# Patient Record
Sex: FEMALE | Race: WHITE | NOT HISPANIC OR LATINO | URBAN - NONMETROPOLITAN AREA
[De-identification: names, ages, dates, MRNs, and addresses within clinical notes are randomized per-mention and may not be internally consistent; named-entity substitution may affect disease eponyms.]

---

## 2020-09-17 ENCOUNTER — OFFICE VISIT (OUTPATIENT)
Dept: URBAN - NONMETROPOLITAN AREA CLINIC 2 | Facility: CLINIC | Age: 35
End: 2020-09-17

## 2020-09-24 ENCOUNTER — OFFICE VISIT (OUTPATIENT)
Dept: URBAN - NONMETROPOLITAN AREA CLINIC 2 | Facility: CLINIC | Age: 35
End: 2020-09-24
Payer: MEDICARE

## 2020-09-24 ENCOUNTER — OFFICE VISIT (OUTPATIENT)
Dept: URBAN - NONMETROPOLITAN AREA CLINIC 2 | Facility: CLINIC | Age: 35
End: 2020-09-24

## 2020-09-24 DIAGNOSIS — K59.01 CONSTIPATION: ICD-10-CM

## 2020-09-24 DIAGNOSIS — K60.2 ANAL FISSURE: ICD-10-CM

## 2020-09-24 DIAGNOSIS — E66.9 OBESITY: ICD-10-CM

## 2020-09-24 PROCEDURE — G8417 CALC BMI ABV UP PARAM F/U: HCPCS | Performed by: NURSE PRACTITIONER

## 2020-09-24 PROCEDURE — 99203 OFFICE O/P NEW LOW 30 MIN: CPT | Performed by: NURSE PRACTITIONER

## 2020-09-24 PROCEDURE — 1036F TOBACCO NON-USER: CPT | Performed by: NURSE PRACTITIONER

## 2020-09-24 PROCEDURE — G8427 DOCREV CUR MEDS BY ELIG CLIN: HCPCS | Performed by: NURSE PRACTITIONER

## 2020-09-24 RX ORDER — ACETAMINOPHEN AND CODEINE PHOSPHATE 300; 30 MG/1; MG/1
1 TABLET AS NEEDED TABLET ORAL
Status: ACTIVE | COMMUNITY

## 2020-09-24 RX ORDER — PRAMIPEXOLE DIHYDROCHLORIDE 0.75 MG/1
1 TABLET TABLET ORAL ONCE A DAY
Status: ACTIVE | COMMUNITY

## 2020-09-24 RX ORDER — UBROGEPANT 100 MG/1
1 TABLET MAY TAKE SECOND DOSE AT LEAST 2 HOURS AFTER FIRST DOSE AS NEEDED TABLET ORAL ONCE A DAY
Status: ACTIVE | COMMUNITY

## 2020-09-24 RX ORDER — NALOXEGOL OXALATE 25 MG/1
1 TABLET IN THE MORNING TABLET, FILM COATED ORAL ONCE A DAY
Status: ACTIVE | COMMUNITY

## 2020-09-24 RX ORDER — DOCUSATE SODIUM 100 MG/1
1 TO 4 CAPSULES CAPSULE ORAL
Status: ACTIVE | COMMUNITY

## 2020-09-24 RX ORDER — TIZANIDINE 4 MG/1
1 TABLET AS NEEDED TABLET ORAL THREE TIMES A DAY
Status: ACTIVE | COMMUNITY

## 2020-09-24 RX ORDER — CLONAZEPAM 0.5 MG/1
2 TO 2.5 TABLETS TABLET ORAL ONCE A DAY
Status: ACTIVE | COMMUNITY

## 2020-09-24 RX ORDER — NORGESTIMATE AND ETHINYL ESTRADIOL 0.25-0.035
1 TABLET KIT ORAL ONCE A DAY
Status: ACTIVE | COMMUNITY

## 2020-09-24 RX ORDER — HYDROCODONE BITARTRATE AND ACETAMINOPHEN 10; 325 MG/1; MG/1
1 TABLET AS NEEDED TABLET ORAL
Status: ACTIVE | COMMUNITY

## 2020-09-24 RX ORDER — GABAPENTIN 600 MG/1
1 TABLET TABLET, FILM COATED ORAL ONCE A DAY
Status: ACTIVE | COMMUNITY

## 2020-09-24 RX ORDER — LEVOTHYROXINE SODIUM 0.05 MG/1
1 TABLET IN THE MORNING ON AN EMPTY STOMACH TABLET ORAL ONCE A DAY
Status: ACTIVE | COMMUNITY

## 2020-09-24 NOTE — HPI-TODAY'S VISIT:
Valentina presents for evaluation of rectal bleeding and anal fissure.  She states that she noticed a skin tag 3 weeks ago with pain.  She noticed that her perineum and anus were ashen when after she had a bowel movement.  She is very concerned about the way her anus appeared.  She has a long history of constipation on narcotics as needed.  She used to take Movantik, however now she is on naltrexone regularly.  She is now just taking Colace, 4 daily.  She typically has a bowel movement every 1 to 2 days.  She did go to the emergency room last night as she had fullness and pain with her bowel movement.  Her examination revealed a fissure and skin tag.  She is concerned about her nutrition and what this plays in her bowel movements.  She does struggle with obesity and has a long family history of multiple ailments.  She is also anaphylactically allergic to ammonia and bleach and presents to clinic today with a respirator.  She request specifically that all surfaces not be cleaned prior to her sitting on these due to her severe skin reaction.  Today she is doing well otherwise.  MB

## 2020-10-14 ENCOUNTER — TELEPHONE ENCOUNTER (OUTPATIENT)
Dept: URBAN - NONMETROPOLITAN AREA CLINIC 2 | Facility: CLINIC | Age: 35
End: 2020-10-14

## 2020-10-16 ENCOUNTER — TELEPHONE ENCOUNTER (OUTPATIENT)
Dept: URBAN - NONMETROPOLITAN AREA CLINIC 2 | Facility: CLINIC | Age: 35
End: 2020-10-16

## 2020-10-26 ENCOUNTER — TELEPHONE ENCOUNTER (OUTPATIENT)
Dept: URBAN - METROPOLITAN AREA CLINIC 92 | Facility: CLINIC | Age: 35
End: 2020-10-26

## 2022-01-10 ENCOUNTER — WEB ENCOUNTER (OUTPATIENT)
Dept: URBAN - NONMETROPOLITAN AREA CLINIC 2 | Facility: CLINIC | Age: 37
End: 2022-01-10

## 2022-01-10 ENCOUNTER — OFFICE VISIT (OUTPATIENT)
Dept: URBAN - NONMETROPOLITAN AREA CLINIC 2 | Facility: CLINIC | Age: 37
End: 2022-01-10
Payer: MEDICARE

## 2022-01-10 VITALS
SYSTOLIC BLOOD PRESSURE: 119 MMHG | TEMPERATURE: 98.7 F | DIASTOLIC BLOOD PRESSURE: 82 MMHG | HEART RATE: 88 BPM | HEIGHT: 68 IN | BODY MASS INDEX: 44.41 KG/M2 | WEIGHT: 293 LBS

## 2022-01-10 DIAGNOSIS — K62.5 RECTAL BLEEDING: ICD-10-CM

## 2022-01-10 DIAGNOSIS — K59.09 CHANGE IN BOWEL MOVEMENTS INTERMITTENT CONSTIPATION. URGENCY IN THE MORNING.: ICD-10-CM

## 2022-01-10 DIAGNOSIS — R11.0 NAUSEA: ICD-10-CM

## 2022-01-10 DIAGNOSIS — K60.2 ANAL FISSURE: ICD-10-CM

## 2022-01-10 PROCEDURE — 99214 OFFICE O/P EST MOD 30 MIN: CPT | Performed by: NURSE PRACTITIONER

## 2022-01-10 RX ORDER — GABAPENTIN 600 MG/1
1 TABLET TABLET, FILM COATED ORAL ONCE A DAY
Status: ACTIVE | COMMUNITY

## 2022-01-10 RX ORDER — DOCUSATE SODIUM 100 MG/1
1 TO 4 CAPSULES CAPSULE ORAL
Status: ACTIVE | COMMUNITY

## 2022-01-10 RX ORDER — CLONAZEPAM 0.5 MG/1
2 TO 2.5 TABLETS TABLET ORAL ONCE A DAY
Status: ACTIVE | COMMUNITY

## 2022-01-10 RX ORDER — TIZANIDINE 4 MG/1
1 TABLET AS NEEDED TABLET ORAL THREE TIMES A DAY
Status: ACTIVE | COMMUNITY

## 2022-01-10 RX ORDER — NORGESTIMATE AND ETHINYL ESTRADIOL 0.25-0.035
1 TABLET KIT ORAL ONCE A DAY
Status: ACTIVE | COMMUNITY

## 2022-01-10 RX ORDER — PRAMIPEXOLE DIHYDROCHLORIDE 0.75 MG/1
1 TABLET TABLET ORAL ONCE A DAY
Status: ACTIVE | COMMUNITY

## 2022-01-10 RX ORDER — NALOXEGOL OXALATE 25 MG/1
1 TABLET IN THE MORNING TABLET, FILM COATED ORAL ONCE A DAY
Status: ACTIVE | COMMUNITY

## 2022-01-10 RX ORDER — LEVOTHYROXINE SODIUM 0.05 MG/1
1 TABLET IN THE MORNING ON AN EMPTY STOMACH TABLET ORAL ONCE A DAY
Status: ACTIVE | COMMUNITY

## 2022-01-10 RX ORDER — HYDROCODONE BITARTRATE AND ACETAMINOPHEN 10; 325 MG/1; MG/1
1 TABLET AS NEEDED TABLET ORAL
Status: ACTIVE | COMMUNITY

## 2022-01-10 RX ORDER — ACETAMINOPHEN AND CODEINE PHOSPHATE 300; 30 MG/1; MG/1
1 TABLET AS NEEDED TABLET ORAL
Status: ACTIVE | COMMUNITY

## 2022-01-10 RX ORDER — UBROGEPANT 100 MG/1
1 TABLET MAY TAKE SECOND DOSE AT LEAST 2 HOURS AFTER FIRST DOSE AS NEEDED TABLET ORAL ONCE A DAY
Status: ACTIVE | COMMUNITY

## 2022-01-10 NOTE — HPI-TODAY'S VISIT:
Valentina presents for evaluation of rectal bleeding and anal fissure.  She states that she noticed a skin tag 3 weeks ago with pain.  She noticed that her perineum and anus were ashen when after she had a bowel movement.  She is very concerned about the way her anus appeared.  She has a long history of constipation on narcotics as needed.  She used to take Movantik, however now she is on naltrexone regularly.  She is now just taking Colace, 4 daily.  She typically has a bowel movement every 1 to 2 days.  She did go to the emergency room last night as she had fullness and pain with her bowel movement.  Her examination revealed a fissure and skin tag.  She is concerned about her nutrition and what this plays in her bowel movements.  She does struggle with obesity and has a long family history of multiple ailments.  She is also anaphylactically allergic to ammonia and bleach and presents to clinic today with a respirator.  She request specifically that all surfaces not be cleaned prior to her sitting on these due to her severe skin reaction.  Today she is doing well otherwise.  MB  1/10/2022 Valentina presents for evaluation of rectal bleeding.  On December 18 she had a bowel movement with straining and passed bright red blood per rectum with clots.  She had a bowel movement with clots the week following since then she has had bright red blood per rectum with wiping.  She is on multiple anticholinergic medications.  Unfortunately she is allergic to PEG and propylene glycol.  She is unable to tolerate MiraLAX, she took 1 Colace and had severe rash.  She takes magnesium citrate which helps with her migraines and her bowels usually.  She has anaphylaxis to ammonia and multiple other cleaning agents.  She is able to tolerate Preparation H.  She is also able to tolerate betamethasone but no other compounded steroid creams.  She follows with Dr. Nelson for severe allergies.  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  Today we had a long discussion regarding her work-up.  Unfortunately she is not a candidate for sedation or procedure given her multiple allergies.  On exam she does have a grade 1 external hemorrhoid along with a skin tag with a history of a healed anal fissure.  Her hemoglobin on Christmas Eve was above 12 and it has been stable for over 2 years.  She is currently on her cycle.  She would like labs done to see if she is iron deficient.  She has multiple nutritional triggers for her migraines.  She is never been evaluated by naturopath.  Valentina has a complicated case but is here today for evaluation of her rectal bleeding.  MB

## 2022-01-12 ENCOUNTER — LAB OUTSIDE AN ENCOUNTER (OUTPATIENT)
Dept: URBAN - METROPOLITAN AREA CLINIC 92 | Facility: CLINIC | Age: 37
End: 2022-01-12

## 2022-01-12 ENCOUNTER — TELEPHONE ENCOUNTER (OUTPATIENT)
Dept: URBAN - METROPOLITAN AREA CLINIC 92 | Facility: CLINIC | Age: 37
End: 2022-01-12

## 2022-01-12 LAB
A/G RATIO: 1.8
ALBUMIN: 4.6
ALKALINE PHOSPHATASE: 103
ALT (SGPT): 39
AST (SGOT): 29
BASO (ABSOLUTE): 0
BASOS: 1
BILIRUBIN, TOTAL: 0.3
BUN/CREATININE RATIO: 12
BUN: 8
CALCIUM: 9.9
CARBON DIOXIDE, TOTAL: 22
CHLORIDE: 103
CREATININE: 0.69
DEAMIDATED GLIADIN ABS, IGA: 6
DEAMIDATED GLIADIN ABS, IGG: 2
EGFR IF AFRICN AM: 128
EGFR IF NONAFRICN AM: 111
ENDOMYSIAL ANTIBODY IGA: NEGATIVE
EOS (ABSOLUTE): 0
EOS: 1
FERRITIN, SERUM: 49
GLOBULIN, TOTAL: 2.6
GLUCOSE: 75
HEMATOCRIT: 38.2
HEMATOLOGY COMMENTS:: (no result)
HEMOGLOBIN: 12.9
IMMATURE CELLS: (no result)
IMMATURE GRANS (ABS): 0
IMMATURE GRANULOCYTES: 0
IMMUNOGLOBULIN A, QN, SERUM: 354
IRON BIND.CAP.(TIBC): 401
IRON SATURATION: 8
IRON: 34
LYMPHS (ABSOLUTE): 2.1
LYMPHS: 27
MCH: 29.5
MCHC: 33.8
MCV: 87
MONOCYTES(ABSOLUTE): 0.5
MONOCYTES: 7
NEUTROPHILS (ABSOLUTE): 4.9
NEUTROPHILS: 64
NRBC: (no result)
PLATELETS: 295
POTASSIUM: 4.1
PROTEIN, TOTAL: 7.2
RBC: 4.38
RDW: 13
SODIUM: 141
T-TRANSGLUTAMINASE (TTG) IGA: <2
T-TRANSGLUTAMINASE (TTG) IGG: 5
T4,FREE(DIRECT): 1.28
TSH: 3.04
UIBC: 367
VITAMIN B12: 1825
VITAMIN D, 25-HYDROXY: 34.2
WBC: 7.5

## 2022-01-13 ENCOUNTER — WEB ENCOUNTER (OUTPATIENT)
Dept: URBAN - NONMETROPOLITAN AREA CLINIC 2 | Facility: CLINIC | Age: 37
End: 2022-01-13

## 2022-01-18 ENCOUNTER — OFFICE VISIT (OUTPATIENT)
Dept: URBAN - NONMETROPOLITAN AREA CLINIC 2 | Facility: CLINIC | Age: 37
End: 2022-01-18

## 2022-01-23 LAB
ACTIN (SMOOTH MUSCLE) ANTIBODY: 9
ANA DIRECT: NEGATIVE
GGT: 17
LIVER-KIDNEY MICROSOMAL AB: 0.6
MITOCHONDRIAL (M2) ANTIBODY: <20

## 2022-01-28 ENCOUNTER — LAB OUTSIDE AN ENCOUNTER (OUTPATIENT)
Dept: URBAN - METROPOLITAN AREA CLINIC 92 | Facility: CLINIC | Age: 37
End: 2022-01-28

## 2022-01-28 ENCOUNTER — WEB ENCOUNTER (OUTPATIENT)
Dept: URBAN - NONMETROPOLITAN AREA CLINIC 13 | Facility: CLINIC | Age: 37
End: 2022-01-28

## 2022-01-28 ENCOUNTER — TELEPHONE ENCOUNTER (OUTPATIENT)
Dept: URBAN - METROPOLITAN AREA CLINIC 92 | Facility: CLINIC | Age: 37
End: 2022-01-28

## 2022-01-31 ENCOUNTER — WEB ENCOUNTER (OUTPATIENT)
Dept: URBAN - NONMETROPOLITAN AREA CLINIC 13 | Facility: CLINIC | Age: 37
End: 2022-01-31

## 2022-01-31 ENCOUNTER — LAB OUTSIDE AN ENCOUNTER (OUTPATIENT)
Dept: URBAN - NONMETROPOLITAN AREA CLINIC 13 | Facility: CLINIC | Age: 37
End: 2022-01-31

## 2022-02-03 ENCOUNTER — WEB ENCOUNTER (OUTPATIENT)
Dept: URBAN - NONMETROPOLITAN AREA CLINIC 13 | Facility: CLINIC | Age: 37
End: 2022-02-03

## 2022-02-04 ENCOUNTER — WEB ENCOUNTER (OUTPATIENT)
Dept: URBAN - NONMETROPOLITAN AREA CLINIC 13 | Facility: CLINIC | Age: 37
End: 2022-02-04

## 2022-02-11 ENCOUNTER — WEB ENCOUNTER (OUTPATIENT)
Dept: URBAN - NONMETROPOLITAN AREA CLINIC 13 | Facility: CLINIC | Age: 37
End: 2022-02-11

## 2022-02-28 ENCOUNTER — WEB ENCOUNTER (OUTPATIENT)
Dept: URBAN - NONMETROPOLITAN AREA CLINIC 13 | Facility: CLINIC | Age: 37
End: 2022-02-28

## 2022-05-10 ENCOUNTER — TELEPHONE ENCOUNTER (OUTPATIENT)
Dept: URBAN - NONMETROPOLITAN AREA CLINIC 13 | Facility: CLINIC | Age: 37
End: 2022-05-10

## 2022-05-10 ENCOUNTER — OFFICE VISIT (OUTPATIENT)
Dept: URBAN - NONMETROPOLITAN AREA CLINIC 2 | Facility: CLINIC | Age: 37
End: 2022-05-10
Payer: MEDICARE

## 2022-05-10 VITALS
HEART RATE: 78 BPM | HEIGHT: 68 IN | TEMPERATURE: 97.5 F | SYSTOLIC BLOOD PRESSURE: 119 MMHG | DIASTOLIC BLOOD PRESSURE: 79 MMHG | BODY MASS INDEX: 43.65 KG/M2 | WEIGHT: 288 LBS

## 2022-05-10 DIAGNOSIS — R11.0 NAUSEA: ICD-10-CM

## 2022-05-10 DIAGNOSIS — K60.2 ANAL FISSURE: ICD-10-CM

## 2022-05-10 DIAGNOSIS — K76.9 LIVER LESION: ICD-10-CM

## 2022-05-10 DIAGNOSIS — K21.9 GASTROESOPHAGEAL REFLUX DISEASE, UNSPECIFIED WHETHER ESOPHAGITIS PRESENT: ICD-10-CM

## 2022-05-10 DIAGNOSIS — R74.8 ELEVATED LIVER ENZYMES: ICD-10-CM

## 2022-05-10 DIAGNOSIS — E66.9 OBESITY: ICD-10-CM

## 2022-05-10 DIAGNOSIS — K62.5 RECTAL BLEEDING: ICD-10-CM

## 2022-05-10 DIAGNOSIS — K59.01 CONSTIPATION: ICD-10-CM

## 2022-05-10 PROCEDURE — 99214 OFFICE O/P EST MOD 30 MIN: CPT | Performed by: NURSE PRACTITIONER

## 2022-05-10 RX ORDER — UBROGEPANT 100 MG/1
1 TABLET MAY TAKE SECOND DOSE AT LEAST 2 HOURS AFTER FIRST DOSE AS NEEDED TABLET ORAL ONCE A DAY
Status: ACTIVE | COMMUNITY

## 2022-05-10 RX ORDER — TIZANIDINE 4 MG/1
1 TABLET AS NEEDED TABLET ORAL THREE TIMES A DAY
Status: ACTIVE | COMMUNITY

## 2022-05-10 RX ORDER — GABAPENTIN 600 MG/1
1 TABLET TABLET, FILM COATED ORAL ONCE A DAY
Status: ACTIVE | COMMUNITY

## 2022-05-10 RX ORDER — PRAMIPEXOLE DIHYDROCHLORIDE 0.75 MG/1
1 TABLET TABLET ORAL ONCE A DAY
Status: ACTIVE | COMMUNITY

## 2022-05-10 RX ORDER — NORGESTIMATE AND ETHINYL ESTRADIOL 0.25-0.035
1 TABLET KIT ORAL ONCE A DAY
Status: ACTIVE | COMMUNITY

## 2022-05-10 RX ORDER — ACETAMINOPHEN AND CODEINE PHOSPHATE 300; 30 MG/1; MG/1
1 TABLET AS NEEDED TABLET ORAL
Status: ACTIVE | COMMUNITY

## 2022-05-10 RX ORDER — DOCUSATE SODIUM 100 MG/1
1 TO 4 CAPSULES CAPSULE ORAL
Status: ACTIVE | COMMUNITY

## 2022-05-10 RX ORDER — LEVOTHYROXINE SODIUM 0.05 MG/1
1 TABLET IN THE MORNING ON AN EMPTY STOMACH TABLET ORAL ONCE A DAY
Status: ACTIVE | COMMUNITY

## 2022-05-10 RX ORDER — NALOXEGOL OXALATE 25 MG/1
1 TABLET IN THE MORNING TABLET, FILM COATED ORAL ONCE A DAY
Status: ACTIVE | COMMUNITY

## 2022-05-10 RX ORDER — HYDROCODONE BITARTRATE AND ACETAMINOPHEN 10; 325 MG/1; MG/1
1 TABLET AS NEEDED TABLET ORAL
Status: ACTIVE | COMMUNITY

## 2022-05-10 RX ORDER — CLONAZEPAM 0.5 MG/1
2 TO 2.5 TABLETS TABLET ORAL ONCE A DAY
Status: ACTIVE | COMMUNITY

## 2022-05-10 NOTE — HPI-TODAY'S VISIT:
Valentina presents for evaluation of rectal bleeding and anal fissure.  She states that she noticed a skin tag 3 weeks ago with pain.  She noticed that her perineum and anus were ashen when after she had a bowel movement.  She is very concerned about the way her anus appeared.  She has a long history of constipation on narcotics as needed.  She used to take Movantik, however now she is on naltrexone regularly.  She is now just taking Colace, 4 daily.  She typically has a bowel movement every 1 to 2 days.  She did go to the emergency room last night as she had fullness and pain with her bowel movement.  Her examination revealed a fissure and skin tag.  She is concerned about her nutrition and what this plays in her bowel movements.  She does struggle with obesity and has a long family history of multiple ailments.  She is also anaphylactically allergic to ammonia and bleach and presents to clinic today with a respirator.  She request specifically that all surfaces not be cleaned prior to her sitting on these due to her severe skin reaction.  Today she is doing well otherwise.  MB  1/10/2022 Valentina presents for evaluation of rectal bleeding.  On December 18 she had a bowel movement with straining and passed bright red blood per rectum with clots.  She had a bowel movement with clots the week following since then she has had bright red blood per rectum with wiping.  She is on multiple anticholinergic medications.  Unfortunately she is allergic to PEG and propylene glycol.  She is unable to tolerate MiraLAX, she took 1 Colace and had severe rash.  She takes magnesium citrate which helps with her migraines and her bowels usually.  She has anaphylaxis to ammonia and multiple other cleaning agents.  She is able to tolerate Preparation H.  She is also able to tolerate betamethasone but no other compounded steroid creams.  She follows with Dr. Nelson for severe allergies.  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  Today we had a long discussion regarding her work-up.  Unfortunately she is not a candidate for sedation or procedure given her multiple allergies.  On exam she does have a grade 1 external hemorrhoid along with a skin tag with a history of a healed anal fissure.  Her hemoglobin on Christmas Eve was above 12 and it has been stable for over 2 years.  She is currently on her cycle.  She would like labs done to see if she is iron deficient.  She has multiple nutritional triggers for her migraines.  She is never been evaluated by naturopath.  Valentina has a complicated case but is here today for evaluation of her rectal bleeding.  MB   5/10/2022 Valentina presents for follow up of GERD, Gastropaersis, fatty liver and rectal bleeding. Her labs show no anemia and mildly elevated ALT. Her US shows fatty liver with ? lesion. Her MRI shows focal fatty sparing. Her rectal bleeding has resolved for now. Today she is struggling with her nutrition for the gastroparesis. Today she is doing well otherwise. MB

## 2022-05-11 LAB
ALBUMIN: 4.2
ALKALINE PHOSPHATASE: 115
ALT (SGPT): 15
AST (SGOT): 14
BILIRUBIN, DIRECT: <0.1
BILIRUBIN, TOTAL: 0.3
PROTEIN, TOTAL: 7.4

## 2022-09-02 ENCOUNTER — TELEPHONE ENCOUNTER (OUTPATIENT)
Dept: URBAN - NONMETROPOLITAN AREA CLINIC 13 | Facility: CLINIC | Age: 37
End: 2022-09-02

## 2022-09-02 RX ORDER — CLONAZEPAM 0.5 MG/1
2 TO 2.5 TABLETS TABLET ORAL ONCE A DAY
Status: ACTIVE | COMMUNITY

## 2022-09-02 RX ORDER — ACETAMINOPHEN AND CODEINE PHOSPHATE 300; 30 MG/1; MG/1
1 TABLET AS NEEDED TABLET ORAL
Status: ACTIVE | COMMUNITY

## 2022-09-02 RX ORDER — LEVOTHYROXINE SODIUM 0.05 MG/1
1 TABLET IN THE MORNING ON AN EMPTY STOMACH TABLET ORAL ONCE A DAY
Status: ACTIVE | COMMUNITY

## 2022-09-02 RX ORDER — DOCUSATE SODIUM 100 MG/1
1 TO 4 CAPSULES CAPSULE ORAL
Status: ACTIVE | COMMUNITY

## 2022-09-02 RX ORDER — NALOXEGOL OXALATE 25 MG/1
1 TABLET IN THE MORNING TABLET, FILM COATED ORAL ONCE A DAY
Qty: 30 TABLET | Refills: 11 | OUTPATIENT
Start: 2022-09-08 | End: 2023-09-03

## 2022-09-02 RX ORDER — UBROGEPANT 100 MG/1
1 TABLET MAY TAKE SECOND DOSE AT LEAST 2 HOURS AFTER FIRST DOSE AS NEEDED TABLET ORAL ONCE A DAY
Status: ACTIVE | COMMUNITY

## 2022-09-02 RX ORDER — TIZANIDINE 4 MG/1
1 TABLET AS NEEDED TABLET ORAL THREE TIMES A DAY
Status: ACTIVE | COMMUNITY

## 2022-09-02 RX ORDER — HYDROCODONE BITARTRATE AND ACETAMINOPHEN 10; 325 MG/1; MG/1
1 TABLET AS NEEDED TABLET ORAL
Status: ACTIVE | COMMUNITY

## 2022-09-02 RX ORDER — NALOXEGOL OXALATE 25 MG/1
1 TABLET IN THE MORNING TABLET, FILM COATED ORAL ONCE A DAY
Status: ACTIVE | COMMUNITY

## 2022-09-02 RX ORDER — GABAPENTIN 600 MG/1
1 TABLET TABLET, FILM COATED ORAL ONCE A DAY
Status: ACTIVE | COMMUNITY

## 2022-09-02 RX ORDER — PRAMIPEXOLE DIHYDROCHLORIDE 0.75 MG/1
1 TABLET TABLET ORAL ONCE A DAY
Status: ACTIVE | COMMUNITY

## 2022-09-02 RX ORDER — NORGESTIMATE AND ETHINYL ESTRADIOL 0.25-0.035
1 TABLET KIT ORAL ONCE A DAY
Status: ACTIVE | COMMUNITY

## 2022-11-08 ENCOUNTER — CLAIMS CREATED FROM THE CLAIM WINDOW (OUTPATIENT)
Dept: URBAN - NONMETROPOLITAN AREA CLINIC 2 | Facility: CLINIC | Age: 37
End: 2022-11-08
Payer: MEDICARE

## 2022-11-08 ENCOUNTER — WEB ENCOUNTER (OUTPATIENT)
Dept: URBAN - NONMETROPOLITAN AREA CLINIC 2 | Facility: CLINIC | Age: 37
End: 2022-11-08

## 2022-11-08 VITALS
WEIGHT: 293 LBS | HEIGHT: 68 IN | HEART RATE: 77 BPM | DIASTOLIC BLOOD PRESSURE: 75 MMHG | TEMPERATURE: 97.5 F | BODY MASS INDEX: 44.41 KG/M2 | SYSTOLIC BLOOD PRESSURE: 111 MMHG

## 2022-11-08 DIAGNOSIS — R74.8 ELEVATED LIVER ENZYMES: ICD-10-CM

## 2022-11-08 DIAGNOSIS — K59.01 CONSTIPATION: ICD-10-CM

## 2022-11-08 DIAGNOSIS — K76.9 LIVER LESION: ICD-10-CM

## 2022-11-08 DIAGNOSIS — K60.2 ANAL FISSURE: ICD-10-CM

## 2022-11-08 DIAGNOSIS — E66.9 OBESITY: ICD-10-CM

## 2022-11-08 DIAGNOSIS — R11.0 NAUSEA: ICD-10-CM

## 2022-11-08 DIAGNOSIS — K21.9 GASTROESOPHAGEAL REFLUX DISEASE, UNSPECIFIED WHETHER ESOPHAGITIS PRESENT: ICD-10-CM

## 2022-11-08 DIAGNOSIS — K59.09 CHANGE IN BOWEL MOVEMENTS INTERMITTENT CONSTIPATION. URGENCY IN THE MORNING.: ICD-10-CM

## 2022-11-08 PROBLEM — 414916001 OBESITY: Status: ACTIVE | Noted: 2020-09-24

## 2022-11-08 PROBLEM — 300331000: Status: ACTIVE | Noted: 2022-01-28

## 2022-11-08 PROCEDURE — 99214 OFFICE O/P EST MOD 30 MIN: CPT | Performed by: NURSE PRACTITIONER

## 2022-11-08 RX ORDER — NALOXEGOL OXALATE 25 MG/1
1 TABLET IN THE MORNING TABLET, FILM COATED ORAL ONCE A DAY
Qty: 30 TABLET | Refills: 11 | Status: ACTIVE | COMMUNITY
Start: 2022-09-08 | End: 2023-09-03

## 2022-11-08 RX ORDER — UBROGEPANT 100 MG/1
1 TABLET MAY TAKE SECOND DOSE AT LEAST 2 HOURS AFTER FIRST DOSE AS NEEDED TABLET ORAL ONCE A DAY
Status: ACTIVE | COMMUNITY

## 2022-11-08 RX ORDER — TIZANIDINE 4 MG/1
1 TABLET AS NEEDED TABLET ORAL THREE TIMES A DAY
Status: ACTIVE | COMMUNITY

## 2022-11-08 RX ORDER — NALOXEGOL OXALATE 25 MG/1
1 TABLET IN THE MORNING TABLET, FILM COATED ORAL ONCE A DAY
Status: ACTIVE | COMMUNITY

## 2022-11-08 RX ORDER — PRAMIPEXOLE DIHYDROCHLORIDE 0.75 MG/1
1 TABLET TABLET ORAL ONCE A DAY
Status: ACTIVE | COMMUNITY

## 2022-11-08 RX ORDER — ACETAMINOPHEN AND CODEINE PHOSPHATE 300; 30 MG/1; MG/1
1 TABLET AS NEEDED TABLET ORAL
Status: ACTIVE | COMMUNITY

## 2022-11-08 RX ORDER — PROCHLORPERAZINE MALEATE 10 MG
1 TABLET AS NEEDED TABLET ORAL THREE TIMES A DAY
Qty: 90 TABLET | Refills: 1 | OUTPATIENT
Start: 2022-11-08

## 2022-11-08 RX ORDER — CLONAZEPAM 0.5 MG/1
2 TO 2.5 TABLETS TABLET ORAL ONCE A DAY
Status: ACTIVE | COMMUNITY

## 2022-11-08 RX ORDER — GABAPENTIN 600 MG/1
1 TABLET TABLET, FILM COATED ORAL ONCE A DAY
Status: ACTIVE | COMMUNITY

## 2022-11-08 RX ORDER — DOCUSATE SODIUM 100 MG/1
1 TO 4 CAPSULES CAPSULE ORAL
Status: ACTIVE | COMMUNITY

## 2022-11-08 RX ORDER — HYDROCODONE BITARTRATE AND ACETAMINOPHEN 10; 325 MG/1; MG/1
1 TABLET AS NEEDED TABLET ORAL
Status: ON HOLD | COMMUNITY

## 2022-11-08 RX ORDER — NORGESTIMATE AND ETHINYL ESTRADIOL 0.25-0.035
1 TABLET KIT ORAL ONCE A DAY
Status: ON HOLD | COMMUNITY

## 2022-11-08 RX ORDER — LEVOTHYROXINE SODIUM 0.05 MG/1
1 TABLET IN THE MORNING ON AN EMPTY STOMACH TABLET ORAL ONCE A DAY
Status: ACTIVE | COMMUNITY

## 2022-11-08 NOTE — HPI-TODAY'S VISIT:
Valentina presents for evaluation of rectal bleeding and anal fissure.  She states that she noticed a skin tag 3 weeks ago with pain.  She noticed that her perineum and anus were ashen when after she had a bowel movement.  She is very concerned about the way her anus appeared.  She has a long history of constipation on narcotics as needed.  She used to take Movantik, however now she is on naltrexone regularly.  She is now just taking Colace, 4 daily.  She typically has a bowel movement every 1 to 2 days.  She did go to the emergency room last night as she had fullness and pain with her bowel movement.  Her examination revealed a fissure and skin tag.  She is concerned about her nutrition and what this plays in her bowel movements.  She does struggle with obesity and has a long family history of multiple ailments.  She is also anaphylactically allergic to ammonia and bleach and presents to clinic today with a respirator.  She request specifically that all surfaces not be cleaned prior to her sitting on these due to her severe skin reaction.  Today she is doing well otherwise.  MB  1/10/2022 Valentina presents for evaluation of rectal bleeding.  On December 18 she had a bowel movement with straining and passed bright red blood per rectum with clots.  She had a bowel movement with clots the week following since then she has had bright red blood per rectum with wiping.  She is on multiple anticholinergic medications.  Unfortunately she is allergic to PEG and propylene glycol.  She is unable to tolerate MiraLAX, she took 1 Colace and had severe rash.  She takes magnesium citrate which helps with her migraines and her bowels usually.  She has anaphylaxis to ammonia and multiple other cleaning agents.  She is able to tolerate Preparation H.  She is also able to tolerate betamethasone but no other compounded steroid creams.  She follows with Dr. Nelson for severe allergies.  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  Today we had a long discussion regarding her work-up.  Unfortunately she is not a candidate for sedation or procedure given her multiple allergies.  On exam she does have a grade 1 external hemorrhoid along with a skin tag with a history of a healed anal fissure.  Her hemoglobin on Christmas Eve was above 12 and it has been stable for over 2 years.  She is currently on her cycle.  She would like labs done to see if she is iron deficient.  She has multiple nutritional triggers for her migraines.  She is never been evaluated by naturopath.  Valentina has a complicated case but is here today for evaluation of her rectal bleeding.  MB   5/10/2022 Valentina presents for follow up of GERD, Gastropaersis, fatty liver and rectal bleeding. Her labs show no anemia and mildly elevated ALT. Her US shows fatty liver with ? lesion. Her MRI shows focal fatty sparing. Her rectal bleeding has resolved for now. Today she is struggling with her nutrition for the gastroparesis. Today she is doing well otherwise. MB  11/8/2022 Valentina presents for follow up of GERD, Gastroparesis, fatty liver and constipation. She states the movantik 25mg daily has helped with her post op constipation on pain medications. Her reflux is stable on Dexilant 30mg daily. She states she retried the promethazine with migraines and had another reaction. She wants to avoid the reglan. She feels the compazine helps her nausea and migraines the most. She wants to work with a program on clean eating. She is hiking and working on weight loss. Today she is doing fairly well. MB

## 2023-01-04 ENCOUNTER — ERX REFILL RESPONSE (OUTPATIENT)
Dept: URBAN - NONMETROPOLITAN AREA CLINIC 2 | Facility: CLINIC | Age: 38
End: 2023-01-04

## 2023-01-04 RX ORDER — PROCHLORPERAZINE MALEATE 10 MG
1 TABLET AS NEEDED TABLET ORAL THREE TIMES A DAY
Qty: 90 TABLET | Refills: 1 | OUTPATIENT

## 2023-03-13 ENCOUNTER — ERX REFILL RESPONSE (OUTPATIENT)
Dept: URBAN - NONMETROPOLITAN AREA CLINIC 2 | Facility: CLINIC | Age: 38
End: 2023-03-13

## 2023-03-13 RX ORDER — PROCHLORPERAZINE MALEATE 10 MG
1 TABLET AS NEEDED TABLET ORAL THREE TIMES A DAY
Qty: 90 TABLET | Refills: 1 | OUTPATIENT

## 2023-05-08 ENCOUNTER — OFFICE VISIT (OUTPATIENT)
Dept: URBAN - NONMETROPOLITAN AREA CLINIC 2 | Facility: CLINIC | Age: 38
End: 2023-05-08

## 2023-05-08 ENCOUNTER — WEB ENCOUNTER (OUTPATIENT)
Dept: URBAN - NONMETROPOLITAN AREA CLINIC 2 | Facility: CLINIC | Age: 38
End: 2023-05-08

## 2023-05-08 ENCOUNTER — TELEPHONE ENCOUNTER (OUTPATIENT)
Dept: URBAN - METROPOLITAN AREA CLINIC 23 | Facility: CLINIC | Age: 38
End: 2023-05-08

## 2023-05-09 ENCOUNTER — OFFICE VISIT (OUTPATIENT)
Dept: URBAN - NONMETROPOLITAN AREA CLINIC 2 | Facility: CLINIC | Age: 38
End: 2023-05-09
Payer: MEDICARE

## 2023-05-09 ENCOUNTER — TELEPHONE ENCOUNTER (OUTPATIENT)
Dept: URBAN - NONMETROPOLITAN AREA CLINIC 2 | Facility: CLINIC | Age: 38
End: 2023-05-09

## 2023-05-09 VITALS
HEIGHT: 68 IN | DIASTOLIC BLOOD PRESSURE: 81 MMHG | SYSTOLIC BLOOD PRESSURE: 168 MMHG | BODY MASS INDEX: 44.41 KG/M2 | HEART RATE: 73 BPM | WEIGHT: 293 LBS

## 2023-05-09 DIAGNOSIS — E66.9 OBESITY: ICD-10-CM

## 2023-05-09 DIAGNOSIS — R74.8 ELEVATED LIVER ENZYMES: ICD-10-CM

## 2023-05-09 DIAGNOSIS — K21.9 GASTROESOPHAGEAL REFLUX DISEASE, UNSPECIFIED WHETHER ESOPHAGITIS PRESENT: ICD-10-CM

## 2023-05-09 DIAGNOSIS — K59.01 CONSTIPATION: ICD-10-CM

## 2023-05-09 DIAGNOSIS — K76.9 LIVER LESION: ICD-10-CM

## 2023-05-09 DIAGNOSIS — R11.0 NAUSEA: ICD-10-CM

## 2023-05-09 DIAGNOSIS — K60.2 ANAL FISSURE: ICD-10-CM

## 2023-05-09 PROBLEM — 707724006: Status: ACTIVE | Noted: 2022-01-12

## 2023-05-09 PROBLEM — 14760008 CONSTIPATION: Status: ACTIVE | Noted: 2020-09-24

## 2023-05-09 PROCEDURE — 99214 OFFICE O/P EST MOD 30 MIN: CPT | Performed by: NURSE PRACTITIONER

## 2023-05-09 RX ORDER — DOCUSATE SODIUM 100 MG/1
1 TO 4 CAPSULES CAPSULE ORAL
Status: DISCONTINUED | COMMUNITY

## 2023-05-09 RX ORDER — PRAMIPEXOLE DIHYDROCHLORIDE 1.5 MG/1
1 TABLET TABLET, EXTENDED RELEASE ORAL ONCE A DAY
Status: ACTIVE | COMMUNITY

## 2023-05-09 RX ORDER — PSEUDOEPHEDRINE HCL 30 MG/1
1 TABLET AS NEEDED TABLET, FILM COATED ORAL
Status: ACTIVE | COMMUNITY

## 2023-05-09 RX ORDER — ACETAMINOPHEN 325 MG
1 TABLET AS NEEDED TABLET ORAL
Status: ACTIVE | COMMUNITY

## 2023-05-09 RX ORDER — BETAMETHASONE DIPROPIONATE 0.5 MG/G
1 APPLICATION OINTMENT TOPICAL ONCE A DAY
Status: ACTIVE | COMMUNITY

## 2023-05-09 RX ORDER — BUTALBITAL AND ACETAMINOPHEN 50; 325 MG/1; MG/1
1 TABLET AS NEEDED TABLET ORAL
Status: ACTIVE | COMMUNITY

## 2023-05-09 RX ORDER — ONDANSETRON HYDROCHLORIDE 4 MG/1
1 TABLET TABLET, FILM COATED ORAL ONCE A DAY
Status: ACTIVE | COMMUNITY

## 2023-05-09 RX ORDER — TIZANIDINE 4 MG/1
1 TABLET AS NEEDED TABLET ORAL THREE TIMES A DAY
Status: ACTIVE | COMMUNITY

## 2023-05-09 RX ORDER — BISMUTH SUBSALICYLATE 262MG/15ML
30 ML WITH MEALS AND AT BEDTIME SUSPENSION, ORAL (FINAL DOSE FORM) ORAL
Status: ACTIVE | COMMUNITY

## 2023-05-09 RX ORDER — DEXLANSOPRAZOLE 30 MG/1
1 CAPSULE CAPSULE, DELAYED RELEASE ORAL ONCE A DAY
Qty: 90 CAPSULE | Refills: 3 | OUTPATIENT
Start: 2023-05-09

## 2023-05-09 RX ORDER — ASCORBIC ACID 125 MG
AS DIRECTED TABLET,CHEWABLE ORAL
Status: ACTIVE | COMMUNITY

## 2023-05-09 RX ORDER — NARATRIPTAN 1 MG/1
1 TABLET TABLET ORAL ONCE A DAY
Status: ACTIVE | COMMUNITY

## 2023-05-09 RX ORDER — MENTHOL 40 MG/ML
1 APPLICATION AS NEEDED GEL TOPICAL THREE TIMES A DAY
Status: ACTIVE | COMMUNITY

## 2023-05-09 RX ORDER — PRAMIPEXOLE DIHYDROCHLORIDE 0.75 MG/1
1 TABLET TABLET ORAL ONCE A DAY
Status: DISCONTINUED | COMMUNITY

## 2023-05-09 RX ORDER — ZINC GLUCONATE, SAMBUCUS NIGRA, AVENA SATIVA L., ROSA CANINA, ECHINACEA PURPUREA 6.2; 2; 4; 50; 13.3 MG/1; MG/1; MG/1; MG/1; MG/1
AS DIRECTED LOZENGE ORAL
Status: ACTIVE | COMMUNITY

## 2023-05-09 RX ORDER — NALOXEGOL OXALATE 25 MG/1
1 TABLET IN THE MORNING TABLET, FILM COATED ORAL ONCE A DAY
Status: DISCONTINUED | COMMUNITY

## 2023-05-09 RX ORDER — DIPHENHYDRAMINE HCL 25 MG/1
1/2 CAPSULE TABLET, COATED ORAL ONCE A DAY
Status: ACTIVE | COMMUNITY

## 2023-05-09 RX ORDER — MUPIROCIN 20 MG/G
1 APPLICATION OINTMENT TOPICAL TWICE A DAY
Status: ACTIVE | COMMUNITY

## 2023-05-09 RX ORDER — MECLIZINE HCL 12.5 MG 12.5 MG/1
1 TABLET AS NEEDED TABLET ORAL
Status: ACTIVE | COMMUNITY

## 2023-05-09 RX ORDER — LIDOCAINE AND PRILOCAINE 25; 25 MG/G; MG/G
AS DIRECTED CREAM TOPICAL
Status: ACTIVE | COMMUNITY

## 2023-05-09 RX ORDER — CALCIUM CARBONATE 500 MG/1
1 TABLET TABLET ORAL ONCE A DAY
Status: ACTIVE | COMMUNITY

## 2023-05-09 RX ORDER — CHOLECALCIFEROL (VITAMIN D3) 50 MCG
1 CAPSULE CAPSULE ORAL ONCE A DAY
Status: ACTIVE | COMMUNITY

## 2023-05-09 RX ORDER — UBROGEPANT 100 MG/1
1 TABLET MAY TAKE SECOND DOSE AT LEAST 2 HOURS AFTER FIRST DOSE AS NEEDED TABLET ORAL ONCE A DAY
Status: ACTIVE | COMMUNITY

## 2023-05-09 RX ORDER — ASCORBIC ACID 250 MG
1 TABLET TABLET,CHEWABLE ORAL ONCE A DAY
Status: ACTIVE | COMMUNITY

## 2023-05-09 RX ORDER — GLYCERIN/PROPYLENE GLYCOL 0.6 %-0.6%
AS DIRECTED DROPPERETTE, SINGLE-USE DROP DISPENSER OPHTHALMIC (EYE)
Status: ACTIVE | COMMUNITY

## 2023-05-09 RX ORDER — CLONAZEPAM 0.5 MG/1
2 TO 2.5 TABLETS TABLET ORAL ONCE A DAY
Status: ACTIVE | COMMUNITY

## 2023-05-09 RX ORDER — ACETAMINOPHEN AND CODEINE PHOSPHATE 300; 30 MG/1; MG/1
1 TABLET AS NEEDED TABLET ORAL
Status: ACTIVE | COMMUNITY

## 2023-05-09 RX ORDER — GABAPENTIN 100 MG/1
1 CAPSULE CAPSULE ORAL ONCE A DAY
Status: ACTIVE | COMMUNITY

## 2023-05-09 RX ORDER — GABAPENTIN 600 MG/1
1 TABLET TABLET, FILM COATED ORAL ONCE A DAY
Status: ACTIVE | COMMUNITY

## 2023-05-09 RX ORDER — MINERAL OIL, PETROLATUM, PHENYLEPHRINE HCL 2.5; 140; 749 MG/G; MG/G; MG/G
AS DIRECTED OINTMENT TOPICAL
Status: ACTIVE | COMMUNITY

## 2023-05-09 RX ORDER — RESVERA/CHROM/GR.TEA/EGCG/DIG3 50MG-20MCG
AS DIRECTED CAPSULE ORAL
Status: ACTIVE | COMMUNITY

## 2023-05-09 RX ORDER — LEVOTHYROXINE SODIUM 75 UG/1
1 TABLET IN THE MORNING ON AN EMPTY STOMACH TABLET ORAL ONCE A DAY
Status: ACTIVE | COMMUNITY

## 2023-05-09 RX ORDER — NORETHINDRONE 0.35 MG/1
1 TABLET TABLET ORAL ONCE A DAY
Status: ACTIVE | COMMUNITY

## 2023-05-09 RX ORDER — RIMEGEPANT SULFATE 75 MG/75MG
1 TABLET ON THE TONGUE AND ALLOW TO DISSOLVE TABLET, ORALLY DISINTEGRATING ORAL
Status: ACTIVE | COMMUNITY

## 2023-05-09 RX ORDER — IRON 18 MG
AS DIRECTED TABLET ORAL
Status: ACTIVE | COMMUNITY

## 2023-05-09 RX ORDER — ACETAMINOPHEN 650 MG/1
2 TABLETS AS NEEDED TABLET ORAL
Status: ACTIVE | COMMUNITY

## 2023-05-09 RX ORDER — CALENDULA OFFICINALIS FLOWERING TOP 1 [HP_X]/G
AS DIRECTED CREAM TOPICAL
Status: ACTIVE | COMMUNITY

## 2023-05-09 RX ORDER — NALOXEGOL OXALATE 25 MG/1
1 TABLET IN THE MORNING TABLET, FILM COATED ORAL ONCE A DAY
Qty: 30 TABLET | Refills: 11 | Status: ACTIVE | COMMUNITY
Start: 2022-09-08 | End: 2023-09-03

## 2023-05-09 RX ORDER — PROCHLORPERAZINE MALEATE 10 MG
1 TABLET AS NEEDED TABLET ORAL THREE TIMES A DAY
Qty: 90 TABLET | Refills: 1 | Status: ACTIVE | COMMUNITY

## 2023-05-09 RX ORDER — GINGER ROOT 550 MG
AS DIRECTED CAPSULE ORAL
Status: ACTIVE | COMMUNITY

## 2023-05-09 RX ORDER — NORGESTIMATE AND ETHINYL ESTRADIOL 0.25-0.035
1 TABLET KIT ORAL ONCE A DAY
Status: DISCONTINUED | COMMUNITY

## 2023-05-09 RX ORDER — NALOXEGOL OXALATE 25 MG/1
1 TABLET IN THE MORNING TABLET, FILM COATED ORAL ONCE A DAY
Qty: 90 TABLET | Refills: 3 | OUTPATIENT
Start: 2023-05-09 | End: 2024-05-03

## 2023-05-09 RX ORDER — METHOCARBAMOL 750 MG/1
1/4 TO 1/2 TABLET TABLET ORAL
Status: ACTIVE | COMMUNITY

## 2023-05-09 RX ORDER — HYDROCODONE BITARTRATE AND ACETAMINOPHEN 10; 325 MG/1; MG/1
1 TABLET AS NEEDED TABLET ORAL
Status: DISCONTINUED | COMMUNITY

## 2023-05-09 RX ORDER — MAGNESIUM GLYCINATE 100 MG
AS DIRECTED CAPSULE ORAL
Status: ACTIVE | COMMUNITY

## 2023-05-09 RX ORDER — SUMATRIPTAN SUCCINATE 50 MG/1
1 TABLET AT LEAST 2 HOURS BETWEEN DOSES AS NEEDED TABLET, FILM COATED ORAL TWICE A DAY
Status: ACTIVE | COMMUNITY

## 2023-05-09 RX ORDER — LIDOCAINE HCL 4 %
1 APPLICATION CREAM (GRAM) TOPICAL THREE TIMES A DAY
Status: ACTIVE | COMMUNITY

## 2023-05-09 NOTE — HPI-TODAY'S VISIT:
Valentina presents for evaluation of rectal bleeding and anal fissure.  She states that she noticed a skin tag 3 weeks ago with pain.  She noticed that her perineum and anus were ashen when after she had a bowel movement.  She is very concerned about the way her anus appeared.  She has a long history of constipation on narcotics as needed.  She used to take Movantik, however now she is on naltrexone regularly.  She is now just taking Colace, 4 daily.  She typically has a bowel movement every 1 to 2 days.  She did go to the emergency room last night as she had fullness and pain with her bowel movement.  Her examination revealed a fissure and skin tag.  She is concerned about her nutrition and what this plays in her bowel movements.  She does struggle with obesity and has a long family history of multiple ailments.  She is also anaphylactically allergic to ammonia and bleach and presents to clinic today with a respirator.  She request specifically that all surfaces not be cleaned prior to her sitting on these due to her severe skin reaction.  Today she is doing well otherwise.  MB  1/10/2022 Valentina presents for evaluation of rectal bleeding.  On December 18 she had a bowel movement with straining and passed bright red blood per rectum with clots.  She had a bowel movement with clots the week following since then she has had bright red blood per rectum with wiping.  She is on multiple anticholinergic medications.  Unfortunately she is allergic to PEG and propylene glycol.  She is unable to tolerate MiraLAX, she took 1 Colace and had severe rash.  She takes magnesium citrate which helps with her migraines and her bowels usually.  She has anaphylaxis to ammonia and multiple other cleaning agents.  She is able to tolerate Preparation H.  She is also able to tolerate betamethasone but no other compounded steroid creams.  She follows with Dr. Nelson for severe allergies.  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  Today we had a long discussion regarding her work-up.  Unfortunately she is not a candidate for sedation or procedure given her multiple allergies.  On exam she does have a grade 1 external hemorrhoid along with a skin tag with a history of a healed anal fissure.  Her hemoglobin on Christmas Eve was above 12 and it has been stable for over 2 years.  She is currently on her cycle.  She would like labs done to see if she is iron deficient.  She has multiple nutritional triggers for her migraines.  She is never been evaluated by naturopath.  Valentina has a complicated case but is here today for evaluation of her rectal bleeding.  MB   5/10/2022 Valentina presents for follow up of GERD, Gastropaersis, fatty liver and rectal bleeding. Her labs show no anemia and mildly elevated ALT. Her US shows fatty liver with ? lesion. Her MRI shows focal fatty sparing. Her rectal bleeding has resolved for now. Today she is struggling with her nutrition for the gastroparesis. Today she is doing well otherwise. MB  11/8/2022 Valentina presents for follow up of GERD, Gastroparesis, fatty liver and constipation. She states the movantik 25mg daily has helped with her post op constipation on pain medications. Her reflux is stable on Dexilant 30mg daily. She states she retried the promethazine with migraines and had another reaction. She wants to avoid the reglan. She feels the compazine helps her nausea and migraines the most. She wants to work with a program on clean eating. She is hiking and working on weight loss. Today she is doing fairly well. MB 5/9/2023 Valentina presents for follow-up of reflux, nausea, and OIC.  She is doing well on Dexilant 30 mg daily.  Her migraines have been better managed and she has had no significant nausea, she does take Compazine when she has a flare this also helps with her migraines.  She is just been taking Movantik 25 mg just as needed.  I want her to try this on a regular basis.  Today her GI complaints are fairly stable.  She will continue to follow with her primary care physician for her lab work.  She continues to work on weight loss.  She unfortunately remains displaced from a septic tank leak  so she has been living in an apartment while they repair her in her mother's home.  She has been walking more often and feels that this is helping her significantly.  MB

## 2023-11-07 ENCOUNTER — DASHBOARD ENCOUNTERS (OUTPATIENT)
Age: 38
End: 2023-11-07

## 2023-11-07 ENCOUNTER — OFFICE VISIT (OUTPATIENT)
Dept: URBAN - NONMETROPOLITAN AREA CLINIC 2 | Facility: CLINIC | Age: 38
End: 2023-11-07
Payer: MEDICARE

## 2023-11-07 VITALS
BODY MASS INDEX: 44.41 KG/M2 | HEIGHT: 68 IN | DIASTOLIC BLOOD PRESSURE: 78 MMHG | TEMPERATURE: 98.7 F | WEIGHT: 293 LBS | SYSTOLIC BLOOD PRESSURE: 125 MMHG | HEART RATE: 80 BPM

## 2023-11-07 DIAGNOSIS — K60.2 ANAL FISSURE: ICD-10-CM

## 2023-11-07 DIAGNOSIS — K59.01 CONSTIPATION: ICD-10-CM

## 2023-11-07 DIAGNOSIS — K21.9 GASTROESOPHAGEAL REFLUX DISEASE, UNSPECIFIED WHETHER ESOPHAGITIS PRESENT: ICD-10-CM

## 2023-11-07 DIAGNOSIS — E66.9 OBESITY: ICD-10-CM

## 2023-11-07 PROBLEM — 30037006 ANAL FISSURE: Status: ACTIVE | Noted: 2020-09-24

## 2023-11-07 PROBLEM — 23913003: Status: ACTIVE | Noted: 2023-11-07

## 2023-11-07 PROBLEM — 422587007: Status: ACTIVE | Noted: 2022-01-10

## 2023-11-07 PROBLEM — 235595009: Status: ACTIVE | Noted: 2022-05-10

## 2023-11-07 PROCEDURE — 99214 OFFICE O/P EST MOD 30 MIN: CPT | Performed by: NURSE PRACTITIONER

## 2023-11-07 RX ORDER — NALOXEGOL OXALATE 25 MG/1
1 TABLET IN THE MORNING TABLET, FILM COATED ORAL ONCE A DAY
Qty: 90 TABLET | Refills: 3 | Status: ACTIVE | COMMUNITY
Start: 2023-05-09 | End: 2024-05-03

## 2023-11-07 RX ORDER — MECLIZINE HYDROCHLORIDE 25 MG/1
1 TABLET AS NEEDED TABLET ORAL
Status: ACTIVE | COMMUNITY

## 2023-11-07 RX ORDER — BETAMETHASONE DIPROPIONATE 0.5 MG/G
1 APPLICATION OINTMENT TOPICAL ONCE A DAY
Status: ACTIVE | COMMUNITY

## 2023-11-07 RX ORDER — DIPHENHYDRAMINE HCL 25 MG/1
1/2 CAPSULE TABLET, COATED ORAL ONCE A DAY
Status: ACTIVE | COMMUNITY

## 2023-11-07 RX ORDER — PRAMIPEXOLE DIHYDROCHLORIDE 1.5 MG/1
1 TABLET TABLET, EXTENDED RELEASE ORAL ONCE A DAY
Status: ACTIVE | COMMUNITY

## 2023-11-07 RX ORDER — GLYCERIN/PROPYLENE GLYCOL 0.6 %-0.6%
AS DIRECTED DROPPERETTE, SINGLE-USE DROP DISPENSER OPHTHALMIC (EYE)
Status: ACTIVE | COMMUNITY

## 2023-11-07 RX ORDER — CALENDULA OFFICINALIS FLOWERING TOP 1 [HP_X]/G
AS DIRECTED CREAM TOPICAL
Status: ACTIVE | COMMUNITY

## 2023-11-07 RX ORDER — CLONAZEPAM 0.5 MG/1
2 TO 2.5 TABLETS TABLET ORAL ONCE A DAY
Status: ACTIVE | COMMUNITY

## 2023-11-07 RX ORDER — ONDANSETRON HYDROCHLORIDE 4 MG/1
1 TABLET TABLET, FILM COATED ORAL ONCE A DAY
Status: ACTIVE | COMMUNITY

## 2023-11-07 RX ORDER — DEXLANSOPRAZOLE 30 MG/1
1 CAPSULE CAPSULE, DELAYED RELEASE ORAL ONCE A DAY
Qty: 90 CAPSULES | Refills: 3
Start: 2023-05-09

## 2023-11-07 RX ORDER — RIMEGEPANT SULFATE 75 MG/75MG
1 TABLET ON THE TONGUE AND ALLOW TO DISSOLVE TABLET, ORALLY DISINTEGRATING ORAL
Status: ACTIVE | COMMUNITY

## 2023-11-07 RX ORDER — BUTYROSPERMUM PARKII(SHEA BUTTER), SIMMONDSIA CHINENSIS (JOJOBA) SEED OIL, ALOE BARBADENSIS LEAF EXTRACT .01; 1; 3.5 G/100G; G/100G; G/100G
AS DIRECTED LIQUID TOPICAL
Status: ACTIVE | COMMUNITY

## 2023-11-07 RX ORDER — IRON 18 MG
AS DIRECTED TABLET ORAL
Status: ACTIVE | COMMUNITY

## 2023-11-07 RX ORDER — MENTHOL 40 MG/ML
1 APPLICATION AS NEEDED GEL TOPICAL THREE TIMES A DAY
Status: ON HOLD | COMMUNITY

## 2023-11-07 RX ORDER — TIZANIDINE 4 MG/1
1 TABLET AS NEEDED TABLET ORAL THREE TIMES A DAY
Status: ACTIVE | COMMUNITY

## 2023-11-07 RX ORDER — PROCHLORPERAZINE MALEATE 10 MG
1 TABLET AS NEEDED TABLET ORAL THREE TIMES A DAY
Qty: 90 TABLET | Refills: 1 | Status: ACTIVE | COMMUNITY

## 2023-11-07 RX ORDER — BISMUTH SUBSALICYLATE 262MG/15ML
30 ML WITH MEALS AND AT BEDTIME SUSPENSION, ORAL (FINAL DOSE FORM) ORAL
Status: ACTIVE | COMMUNITY

## 2023-11-07 RX ORDER — BUTALBITAL AND ACETAMINOPHEN 50; 325 MG/1; MG/1
1 TABLET AS NEEDED TABLET ORAL
Status: ACTIVE | COMMUNITY

## 2023-11-07 RX ORDER — ZINC GLUCONATE, SAMBUCUS NIGRA, AVENA SATIVA L., ROSA CANINA, ECHINACEA PURPUREA 6.2; 2; 4; 50; 13.3 MG/1; MG/1; MG/1; MG/1; MG/1
AS DIRECTED LOZENGE ORAL
Status: ACTIVE | COMMUNITY

## 2023-11-07 RX ORDER — ACETAMINOPHEN AND CODEINE PHOSPHATE 300; 30 MG/1; MG/1
1 TABLET AS NEEDED TABLET ORAL
Status: ACTIVE | COMMUNITY

## 2023-11-07 RX ORDER — MINERAL OIL, PETROLATUM, PHENYLEPHRINE HCL 2.5; 140; 749 MG/G; MG/G; MG/G
AS DIRECTED OINTMENT TOPICAL
Status: ON HOLD | COMMUNITY

## 2023-11-07 RX ORDER — ACETAMINOPHEN 650 MG/1
2 TABLETS AS NEEDED TABLET ORAL
Status: ACTIVE | COMMUNITY

## 2023-11-07 RX ORDER — MUPIROCIN 20 MG/G
1 APPLICATION OINTMENT TOPICAL TWICE A DAY
Status: ACTIVE | COMMUNITY

## 2023-11-07 RX ORDER — GABAPENTIN 600 MG/1
1 TABLET TABLET, FILM COATED ORAL ONCE A DAY
Status: ACTIVE | COMMUNITY

## 2023-11-07 RX ORDER — SUMATRIPTAN SUCCINATE 50 MG/1
1 TABLET AT LEAST 2 HOURS BETWEEN DOSES AS NEEDED TABLET, FILM COATED ORAL TWICE A DAY
Status: ACTIVE | COMMUNITY

## 2023-11-07 RX ORDER — NALOXEGOL OXALATE 25 MG/1
1 TABLET IN THE MORNING TABLET, FILM COATED ORAL ONCE A DAY
Qty: 90 TABLET | Refills: 3
Start: 2023-05-09 | End: 2024-11-01

## 2023-11-07 RX ORDER — NARATRIPTAN 1 MG/1
1 TABLET TABLET ORAL ONCE A DAY
Status: ACTIVE | COMMUNITY

## 2023-11-07 RX ORDER — CALCIUM CARBONATE 500 MG/1
1 TABLET TABLET ORAL ONCE A DAY
Status: ACTIVE | COMMUNITY

## 2023-11-07 RX ORDER — ASCORBIC ACID 125 MG
AS DIRECTED TABLET,CHEWABLE ORAL
Status: ACTIVE | COMMUNITY

## 2023-11-07 RX ORDER — LEVOTHYROXINE SODIUM 75 UG/1
1 TABLET IN THE MORNING ON AN EMPTY STOMACH TABLET ORAL ONCE A DAY
Status: ACTIVE | COMMUNITY

## 2023-11-07 RX ORDER — MAGNESIUM GLYCINATE 100 MG
AS DIRECTED CAPSULE ORAL
Status: ACTIVE | COMMUNITY

## 2023-11-07 RX ORDER — ACETAMINOPHEN 325 MG
1 TABLET AS NEEDED TABLET ORAL
Status: ACTIVE | COMMUNITY

## 2023-11-07 RX ORDER — PSEUDOEPHEDRINE HCL 30 MG/1
1 TABLET AS NEEDED TABLET, FILM COATED ORAL
Status: ACTIVE | COMMUNITY

## 2023-11-07 RX ORDER — LIDOCAINE AND PRILOCAINE 25; 25 MG/G; MG/G
AS DIRECTED CREAM TOPICAL
Status: ACTIVE | COMMUNITY

## 2023-11-07 RX ORDER — DEXLANSOPRAZOLE 30 MG/1
1 CAPSULE CAPSULE, DELAYED RELEASE ORAL ONCE A DAY
Qty: 90 CAPSULE | Refills: 3 | Status: ACTIVE | COMMUNITY
Start: 2023-05-09

## 2023-11-07 RX ORDER — GINGER ROOT 550 MG
AS DIRECTED CAPSULE ORAL
Status: ACTIVE | COMMUNITY

## 2023-11-07 RX ORDER — ASCORBIC ACID 250 MG
1 TABLET TABLET,CHEWABLE ORAL ONCE A DAY
Status: ACTIVE | COMMUNITY

## 2023-11-07 RX ORDER — GABAPENTIN 100 MG/1
1 CAPSULE CAPSULE ORAL ONCE A DAY
Status: ACTIVE | COMMUNITY

## 2023-11-07 RX ORDER — RESVERA/CHROM/GR.TEA/EGCG/DIG3 50MG-20MCG
AS DIRECTED CAPSULE ORAL
Status: ACTIVE | COMMUNITY

## 2023-11-07 RX ORDER — UBROGEPANT 100 MG/1
1 TABLET MAY TAKE SECOND DOSE AT LEAST 2 HOURS AFTER FIRST DOSE AS NEEDED TABLET ORAL ONCE A DAY
Status: ACTIVE | COMMUNITY

## 2023-11-07 RX ORDER — LIDOCAINE HCL 4 %
1 APPLICATION CREAM (GRAM) TOPICAL THREE TIMES A DAY
Status: ACTIVE | COMMUNITY

## 2023-11-07 RX ORDER — NORETHINDRONE 0.35 MG/1
1 TABLET TABLET ORAL ONCE A DAY
Status: ACTIVE | COMMUNITY

## 2023-11-07 RX ORDER — METHOCARBAMOL 750 MG/1
1/4 TO 1/2 TABLET TABLET ORAL
Status: ACTIVE | COMMUNITY

## 2023-11-07 RX ORDER — CHOLECALCIFEROL (VITAMIN D3) 50 MCG
1 CAPSULE CAPSULE ORAL ONCE A DAY
Status: ACTIVE | COMMUNITY

## 2023-11-07 NOTE — HPI-TODAY'S VISIT:
Valentina presents for evaluation of rectal bleeding and anal fissure.  She states that she noticed a skin tag 3 weeks ago with pain.  She noticed that her perineum and anus were ashen when after she had a bowel movement.  She is very concerned about the way her anus appeared.  She has a long history of constipation on narcotics as needed.  She used to take Movantik, however now she is on naltrexone regularly.  She is now just taking Colace, 4 daily.  She typically has a bowel movement every 1 to 2 days.  She did go to the emergency room last night as she had fullness and pain with her bowel movement.  Her examination revealed a fissure and skin tag.  She is concerned about her nutrition and what this plays in her bowel movements.  She does struggle with obesity and has a long family history of multiple ailments.  She is also anaphylactically allergic to ammonia and bleach and presents to clinic today with a respirator.  She request specifically that all surfaces not be cleaned prior to her sitting on these due to her severe skin reaction.  Today she is doing well otherwise.  MB  1/10/2022 Valentina presents for evaluation of rectal bleeding.  On December 18 she had a bowel movement with straining and passed bright red blood per rectum with clots.  She had a bowel movement with clots the week following since then she has had bright red blood per rectum with wiping.  She is on multiple anticholinergic medications.  Unfortunately she is allergic to PEG and propylene glycol.  She is unable to tolerate MiraLAX, she took 1 Colace and had severe rash.  She takes magnesium citrate which helps with her migraines and her bowels usually.  She has anaphylaxis to ammonia and multiple other cleaning agents.  She is able to tolerate Preparation H.  She is also able to tolerate betamethasone but no other compounded steroid creams.  She follows with Dr. Nelson for severe allergies.  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  Today we had a long discussion regarding her work-up.  Unfortunately she is not a candidate for sedation or procedure given her multiple allergies.  On exam she does have a grade 1 external hemorrhoid along with a skin tag with a history of a healed anal fissure.  Her hemoglobin on Christmas Eve was above 12 and it has been stable for over 2 years.  She is currently on her cycle.  She would like labs done to see if she is iron deficient.  She has multiple nutritional triggers for her migraines.  She is never been evaluated by naturopath.  Valentina has a complicated case but is here today for evaluation of her rectal bleeding.  MB   5/10/2022 Valentina presents for follow up of GERD, Gastropaersis, fatty liver and rectal bleeding. Her labs show no anemia and mildly elevated ALT. Her US shows fatty liver with ? lesion. Her MRI shows focal fatty sparing. Her rectal bleeding has resolved for now. Today she is struggling with her nutrition for the gastroparesis. Today she is doing well otherwise. MB  11/8/2022 Valentina presents for follow up of GERD, Gastroparesis, fatty liver and constipation. She states the movantik 25mg daily has helped with her post op constipation on pain medications. Her reflux is stable on Dexilant 30mg daily. She states she retried the promethazine with migraines and had another reaction. She wants to avoid the reglan. She feels the compazine helps her nausea and migraines the most. She wants to work with a program on clean eating. She is hiking and working on weight loss. Today she is doing fairly well. MB 5/9/2023 Valentina presents for follow-up of reflux, nausea, and OIC.  She is doing well on Dexilant 30 mg daily.  Her migraines have been better managed and she has had no significant nausea, she does take Compazine when she has a flare this also helps with her migraines.  She is just been taking Movantik 25 mg just as needed.  I want her to try this on a regular basis.  Today her GI complaints are fairly stable.  She will continue to follow with her primary care physician for her lab work.  She continues to work on weight loss.  She unfortunately remains displaced from a septic tank leak  so she has been living in an apartment while they repair her in her mother's home.  She has been walking more often and feels that this is helping her significantly.  MB 11/7/2023 Valentina presents for follow-up of reflux and OIC.  She is doing great on Dexilant 30 mg daily and Movantik 25 mg daily.  She has had an issue with her external skin tags with hygiene.  Today we have discussed sitz bath's.  She cannot tolerate any topical creams given her severe medication allergies.  Her fatty liver is stable, she has gained some weight, I do want her to continue to monitor her labs with her PCP.  Today she is doing well otherwise.  MB

## 2024-05-09 ENCOUNTER — ERX REFILL RESPONSE (OUTPATIENT)
Dept: URBAN - NONMETROPOLITAN AREA CLINIC 2 | Facility: CLINIC | Age: 39
End: 2024-05-09

## 2024-05-09 RX ORDER — DEXLANSOPRAZOLE 30 MG/1
1 CAPSULE CAPSULE, DELAYED RELEASE ORAL ONCE A DAY
Qty: 90 CAPSULES | Refills: 3 | OUTPATIENT

## 2025-04-04 ENCOUNTER — ERX REFILL RESPONSE (OUTPATIENT)
Dept: URBAN - NONMETROPOLITAN AREA CLINIC 2 | Facility: CLINIC | Age: 40
End: 2025-04-04

## 2025-04-04 RX ORDER — NALOXEGOL OXALATE 25 MG/1
1 TABLET IN THE MORNING TABLET, FILM COATED ORAL ONCE A DAY
Qty: 90 TABLET | Refills: 3